# Patient Record
Sex: MALE | Race: BLACK OR AFRICAN AMERICAN | NOT HISPANIC OR LATINO | Employment: OTHER | URBAN - METROPOLITAN AREA
[De-identification: names, ages, dates, MRNs, and addresses within clinical notes are randomized per-mention and may not be internally consistent; named-entity substitution may affect disease eponyms.]

---

## 2019-12-23 PROBLEM — D3A.8 PRIMARY PANCREATIC NEUROENDOCRINE TUMOR: Status: ACTIVE | Noted: 2019-12-23

## 2020-03-16 PROBLEM — B20 HIV (HUMAN IMMUNODEFICIENCY VIRUS INFECTION): Status: ACTIVE | Noted: 2020-03-16

## 2020-03-16 PROBLEM — K20.90 ESOPHAGITIS: Status: ACTIVE | Noted: 2020-03-16

## 2020-03-16 PROBLEM — K86.89 PANCREATIC MASS: Status: ACTIVE | Noted: 2018-01-18

## 2020-03-16 PROBLEM — G89.3 CANCER RELATED PAIN: Status: ACTIVE | Noted: 2019-08-21

## 2020-03-16 PROBLEM — Z21 HIV (HUMAN IMMUNODEFICIENCY VIRUS INFECTION): Status: ACTIVE | Noted: 2020-03-16

## 2020-04-13 PROBLEM — M54.50 CHRONIC BILATERAL LOW BACK PAIN WITHOUT SCIATICA: Status: ACTIVE | Noted: 2019-02-08

## 2020-04-13 PROBLEM — G89.29 CHRONIC BILATERAL LOW BACK PAIN WITHOUT SCIATICA: Status: ACTIVE | Noted: 2019-02-08

## 2020-04-13 PROBLEM — C7A.8 NEUROENDOCRINE CANCER: Status: ACTIVE | Noted: 2018-09-19

## 2020-04-13 PROBLEM — D49.7 PITUITARY TUMOR: Status: ACTIVE | Noted: 2018-07-26

## 2020-04-16 PROBLEM — Z51.5 PALLIATIVE CARE BY SPECIALIST: Status: ACTIVE | Noted: 2020-04-16

## 2020-04-16 PROBLEM — D3A.8 PRIMARY PANCREATIC NEUROENDOCRINE TUMOR: Status: ACTIVE | Noted: 2018-01-18

## 2020-04-16 PROBLEM — Z90.411 HISTORY OF PARTIAL PANCREATECTOMY: Status: ACTIVE | Noted: 2020-04-16

## 2020-04-16 PROBLEM — G89.3 CANCER ASSOCIATED PAIN: Status: ACTIVE | Noted: 2020-04-16

## 2020-04-16 PROBLEM — F41.9 ANXIETY: Status: ACTIVE | Noted: 2020-04-16

## 2020-04-16 PROBLEM — C7A.8 NEUROENDOCRINE CANCER: Status: RESOLVED | Noted: 2018-09-19 | Resolved: 2020-04-16

## 2020-05-05 ENCOUNTER — TELEPHONE (OUTPATIENT)
Dept: ADMINISTRATIVE | Facility: CLINIC | Age: 25
End: 2020-05-05

## 2020-05-05 NOTE — PROGRESS NOTES
"Called pt verified ID x 2, notified of negative covid test results, pt reports "I feel fine", denies complaints and voiced understanding  "

## 2020-05-19 ENCOUNTER — NURSE TRIAGE (OUTPATIENT)
Dept: ADMINISTRATIVE | Facility: CLINIC | Age: 25
End: 2020-05-19

## 2020-05-19 NOTE — TELEPHONE ENCOUNTER
Called patient on behalf of [facility]. No answer. Left message to let patient know someone will call back tomorrow.

## 2020-05-20 NOTE — TELEPHONE ENCOUNTER
Patient contacted for COVID-19 post procedural symptoms monitoring. Pt denies cough, fever, or difficulty breathing post procedure. Pt c/o pain from procedure but states he has spoken to his PCP and has pain medications, denies further needs.     Reason for Disposition   [1] Follow-up call to recent contact AND [2] information only call, no triage required    Additional Information   Negative: [1] Caller is not with the adult (patient) AND [2] reporting urgent symptoms   Negative: Lab result questions   Negative: Medication questions   Negative: Caller can't be reached by phone   Negative: Caller has already spoken to PCP or another triager   Negative: RN needs further essential information from caller in order to complete triage   Negative: Requesting regular office appointment   Negative: [1] Caller requesting NON-URGENT health information AND [2] PCP's office is the best resource   Negative: [1] Caller is not with the adult (patient) AND [2] probable NON-URGENT symptoms   Negative: Question about upcoming scheduled test, no triage required and triager able to answer question   Negative: General information question, no triage required and triager able to answer question   Negative: Health Information question, no triage required and triager able to answer question    Protocols used: INFORMATION ONLY CALL-A-

## 2020-05-26 ENCOUNTER — NURSE TRIAGE (OUTPATIENT)
Dept: ADMINISTRATIVE | Facility: CLINIC | Age: 25
End: 2020-05-26

## 2020-05-26 NOTE — TELEPHONE ENCOUNTER
Called patient on behalf of [facility]. No answer. Called mother's number. No answer. Called at [phone].      Reason for Disposition   No answer.  First attempt to contact caller.  Follow-up call scheduled within 15 minutes.    Protocols used: NO CONTACT OR DUPLICATE CONTACT CALL-A-[place]

## 2020-05-27 NOTE — TELEPHONE ENCOUNTER
Denies symptoms  Reason for Disposition   Health Information question, no triage required and triager able to answer question    Protocols used: INFORMATION ONLY CALL-A-[facility]

## 2020-05-28 ENCOUNTER — NURSE TRIAGE (OUTPATIENT)
Dept: ADMINISTRATIVE | Facility: CLINIC | Age: 25
End: 2020-05-28

## 2020-05-28 NOTE — TELEPHONE ENCOUNTER
Call patient on behalf of [facility], denies any fever, cough, SOB. Day 14    Reason for Disposition   [1] Follow-up call to recent contact AND [2] information only call, no triage required    Additional Information   Negative: [1] Caller is not with the adult (patient) AND [2] reporting urgent symptoms   Negative: Lab result questions   Negative: Medication questions   Negative: Caller can't be reached by phone   Negative: Caller has already spoken to PCP or another triager   Negative: RN needs further essential information from caller in order to complete triage   Negative: Requesting regular office appointment   Negative: [1] Caller requesting NON-URGENT health information AND [2] PCP's office is the best resource   Negative: Health Information question, no triage required and triager able to answer question   Negative: General information question, no triage required and triager able to answer question   Negative: Question about upcoming scheduled test, no triage required and triager able to answer question   Negative: [1] Caller is not with the adult (patient) AND [2] probable NON-URGENT symptoms    Protocols used: INFORMATION ONLY CALL-A-

## 2020-06-04 ENCOUNTER — NURSE TRIAGE (OUTPATIENT)
Dept: ADMINISTRATIVE | Facility: CLINIC | Age: 25
End: 2020-06-04

## 2020-07-22 PROBLEM — G89.29 CHRONIC PAIN: Status: ACTIVE | Noted: 2020-07-22

## 2020-07-22 PROBLEM — R10.9 ABDOMINAL PAIN: Status: ACTIVE | Noted: 2020-07-22

## 2021-03-02 PROBLEM — G89.4 CHRONIC PAIN DISORDER: Status: ACTIVE | Noted: 2021-03-02

## 2021-04-24 PROBLEM — F32.A DEPRESSION: Status: ACTIVE | Noted: 2021-04-24

## 2021-04-24 PROBLEM — K76.89 SUBCAPSULAR HEMATOMA OF LIVER: Status: ACTIVE | Noted: 2021-04-24

## 2021-04-24 PROBLEM — G89.4 CHRONIC PAIN DISORDER: Status: RESOLVED | Noted: 2021-03-02 | Resolved: 2021-04-24

## 2021-04-24 PROBLEM — R42 LIGHTHEADED: Status: ACTIVE | Noted: 2021-04-24

## 2021-04-24 PROBLEM — F31.9 BIPOLAR DEPRESSION: Status: ACTIVE | Noted: 2021-04-24

## 2021-04-25 PROBLEM — R42 LIGHTHEADED: Status: RESOLVED | Noted: 2021-04-24 | Resolved: 2021-04-25

## 2021-04-28 PROBLEM — J45.909 REACTIVE AIRWAY DISEASE WITHOUT COMPLICATION: Status: ACTIVE | Noted: 2021-04-28

## 2021-05-12 ENCOUNTER — PATIENT MESSAGE (OUTPATIENT)
Dept: RESEARCH | Facility: HOSPITAL | Age: 26
End: 2021-05-12

## 2021-08-16 PROBLEM — R06.02 SOB (SHORTNESS OF BREATH): Status: ACTIVE | Noted: 2021-08-16

## 2021-08-16 PROBLEM — R79.89 ELEVATED LACTIC ACID LEVEL: Status: ACTIVE | Noted: 2021-08-16

## 2021-08-16 PROBLEM — J45.20 MILD INTERMITTENT ASTHMA WITHOUT COMPLICATION: Status: ACTIVE | Noted: 2021-08-16

## 2021-08-17 PROBLEM — J45.901 ASTHMA EXACERBATION: Status: ACTIVE | Noted: 2021-08-17

## 2021-08-17 PROBLEM — E83.39 HYPOPHOSPHATEMIA: Status: ACTIVE | Noted: 2021-08-17

## 2021-08-18 PROBLEM — J45.909 MODERATE ASTHMA: Status: ACTIVE | Noted: 2021-08-18

## 2021-09-23 PROBLEM — M54.17 RADICULITIS, LUMBOSACRAL: Status: ACTIVE | Noted: 2021-09-23

## 2021-11-18 PROBLEM — J45.41 MODERATE PERSISTENT ASTHMA WITH EXACERBATION: Status: ACTIVE | Noted: 2021-08-17

## 2021-11-18 PROBLEM — F17.210 CIGARETTE NICOTINE DEPENDENCE WITHOUT COMPLICATION: Status: ACTIVE | Noted: 2021-11-18

## 2021-12-29 PROBLEM — M47.812 ARTHROPATHY OF CERVICAL FACET JOINT: Status: ACTIVE | Noted: 2021-12-29

## 2022-02-09 PROBLEM — R06.02 SOB (SHORTNESS OF BREATH): Status: RESOLVED | Noted: 2021-08-16 | Resolved: 2022-02-09

## 2022-02-09 PROBLEM — R79.89 ELEVATED LACTIC ACID LEVEL: Status: RESOLVED | Noted: 2021-08-16 | Resolved: 2022-02-09

## 2022-02-09 PROBLEM — J45.20 MILD INTERMITTENT ASTHMA WITHOUT COMPLICATION: Status: RESOLVED | Noted: 2021-08-16 | Resolved: 2022-02-09

## 2022-02-09 PROBLEM — J45.41 MODERATE PERSISTENT ASTHMA WITH EXACERBATION: Status: RESOLVED | Noted: 2021-08-17 | Resolved: 2022-02-09

## 2023-07-10 PROBLEM — C7B.8 NEUROENDOCRINE CARCINOMA METASTATIC TO LIVER: Status: ACTIVE | Noted: 2023-07-10

## 2023-07-10 PROBLEM — C7A.8 NEUROENDOCRINE CARCINOMA METASTATIC TO LIVER: Status: ACTIVE | Noted: 2023-07-10

## 2023-11-21 ENCOUNTER — PATIENT OUTREACH (OUTPATIENT)
Dept: ADMINISTRATIVE | Facility: CLINIC | Age: 28
End: 2023-11-21

## 2024-10-25 ENCOUNTER — PATIENT MESSAGE (OUTPATIENT)
Dept: RESEARCH | Facility: HOSPITAL | Age: 29
End: 2024-10-25

## 2025-03-13 PROBLEM — R11.2 NAUSEA AND VOMITING: Status: ACTIVE | Noted: 2025-03-13

## 2025-03-13 PROBLEM — C79.9 METASTATIC MALIGNANT NEOPLASM: Status: ACTIVE | Noted: 2025-03-13

## 2025-03-17 ENCOUNTER — PATIENT OUTREACH (OUTPATIENT)
Dept: ADMINISTRATIVE | Facility: CLINIC | Age: 30
End: 2025-03-17

## 2025-03-18 PROBLEM — R19.7 DIARRHEA: Status: ACTIVE | Noted: 2025-03-18

## 2025-03-18 PROBLEM — R31.9 HEMATURIA: Status: ACTIVE | Noted: 2025-03-18

## 2025-03-22 PROBLEM — R11.10 VOMITING: Status: ACTIVE | Noted: 2025-03-13

## 2025-03-25 PROBLEM — A09 DIARRHEA OF INFECTIOUS ORIGIN: Status: ACTIVE | Noted: 2025-03-25

## 2025-03-25 PROBLEM — R11.10 VOMITING: Status: RESOLVED | Noted: 2025-03-13 | Resolved: 2025-03-25

## 2025-03-25 PROBLEM — R31.9 HEMATURIA: Status: RESOLVED | Noted: 2025-03-18 | Resolved: 2025-03-25

## 2025-03-25 PROBLEM — R19.7 DIARRHEA: Status: RESOLVED | Noted: 2025-03-18 | Resolved: 2025-03-25

## 2025-03-25 PROBLEM — A08.39 CMV COLITIS: Status: ACTIVE | Noted: 2025-03-25

## 2025-03-25 PROBLEM — B25.9 CMV COLITIS: Status: ACTIVE | Noted: 2025-03-25

## 2025-03-26 ENCOUNTER — PATIENT OUTREACH (OUTPATIENT)
Dept: ADMINISTRATIVE | Facility: CLINIC | Age: 30
End: 2025-03-26